# Patient Record
Sex: FEMALE | Race: WHITE
[De-identification: names, ages, dates, MRNs, and addresses within clinical notes are randomized per-mention and may not be internally consistent; named-entity substitution may affect disease eponyms.]

---

## 2019-10-11 ENCOUNTER — HOSPITAL ENCOUNTER (OUTPATIENT)
Dept: HOSPITAL 92 - SCSCT | Age: 54
Discharge: HOME | End: 2019-10-11
Attending: PHYSICIAN ASSISTANT
Payer: COMMERCIAL

## 2019-10-11 DIAGNOSIS — G89.29: ICD-10-CM

## 2019-10-11 DIAGNOSIS — R10.11: Primary | ICD-10-CM

## 2019-10-11 DIAGNOSIS — N20.0: ICD-10-CM

## 2019-10-11 DIAGNOSIS — Z90.49: ICD-10-CM

## 2019-10-11 DIAGNOSIS — Z90.710: ICD-10-CM

## 2019-10-11 DIAGNOSIS — K76.9: ICD-10-CM

## 2019-10-11 PROCEDURE — 87510 GARDNER VAG DNA DIR PROBE: CPT

## 2019-10-11 PROCEDURE — 87660 TRICHOMONAS VAGIN DIR PROBE: CPT

## 2019-10-11 PROCEDURE — 87480 CANDIDA DNA DIR PROBE: CPT

## 2019-10-11 PROCEDURE — 74176 CT ABD & PELVIS W/O CONTRAST: CPT

## 2019-10-11 NOTE — CT
CT Stone  Protocol

10/11/2019 12:00 AM



HISTORY:

Chronic right upper quadrant abdominal pain. Surgical history includes hysterectomy, cholecystectomy,
 and appendectomy as well as bowel resection. History of kidney stones.



COMPARISON:

11/20/2018



Technique: 

Multiple contiguous axial CT images are obtained through the abdomen and pelvis without IV contrast. 
Coronal reformats are provided.



FINDINGS:

This examination is limited for the evaluation of solid organs and vascular structures due to the lac
k of intravenous contrast.



Lower Chest: Linear atelectasis versus scarring at the right lung base. Lung bases are otherwise yovani
r.



Abdomen:

Liver: Stable small hypodense subcentimeter lesion in the lateral segment left hepatic lobe biliary s
tatistically likely representing a small cyst.

Gallbladder: Surgically absent.

Pancreas: Grossly normal nonenhanced CT appearance.

Spleen: Grossly normal nonenhanced CT appearance.

Adrenals: Grossly normal nonenhanced CT appearance.

Kidneys: Approximately 2 mm nonobstructing calculus midportion right kidney. No left renal calculus i
s seen, and there is no hydronephrosis. Subcentimeter too small to characterize exophytic hypodense

lesion midportion right kidney is again present.

Ureters: No ureteral calculus is seen..



Pelvis:

Urinary bladder: within normal limits.

Reproductive Organs: Evidence of hysterectomy.



Lymph Nodes: No enlarged lymph nodes.



Bowel: Loops of small bowel are normal in caliber.

Appendix: Not visualized. Suture material is seen at the cecal apex likely attributable to patient's 
history of prior appendectomy. 



Peritoneum: No free fluid, free air, or  fluid collection.

Retroperitoneum: within normal limits.



Vessels: Abdominal aorta is normal in caliber..



Abdominal Wall: Tiny fat-containing umbilical hernia.

Bones: Mild degenerative changes are seen in the spine.  



IMPRESSION:

1. Tiny nonobstructing right renal calculus.

2. Postsurgical changes related to hysterectomy, appendectomy, and cholecystectomy.

3. Subcentimeter difficult to characterize hypodense lesion lateral segment left hepatic lobe stable 
from prior study and statistically likely represents a cyst. 

4. No acute findings are seen in the abdomen or pelvis.



Reported By: Be Roberts 

Electronically Signed:  10/11/2019 2:54 PM